# Patient Record
Sex: FEMALE | Race: WHITE | URBAN - METROPOLITAN AREA
[De-identification: names, ages, dates, MRNs, and addresses within clinical notes are randomized per-mention and may not be internally consistent; named-entity substitution may affect disease eponyms.]

---

## 2024-10-15 ENCOUNTER — LAB OUTSIDE AN ENCOUNTER (OUTPATIENT)
Dept: URBAN - METROPOLITAN AREA CLINIC 54 | Facility: CLINIC | Age: 69
End: 2024-10-15

## 2024-10-15 ENCOUNTER — OFFICE VISIT (OUTPATIENT)
Dept: URBAN - METROPOLITAN AREA CLINIC 54 | Facility: CLINIC | Age: 69
End: 2024-10-15
Payer: MEDICARE

## 2024-10-15 ENCOUNTER — DASHBOARD ENCOUNTERS (OUTPATIENT)
Age: 69
End: 2024-10-15

## 2024-10-15 VITALS
BODY MASS INDEX: 29.82 KG/M2 | DIASTOLIC BLOOD PRESSURE: 70 MMHG | HEIGHT: 71 IN | HEART RATE: 78 BPM | TEMPERATURE: 97.2 F | WEIGHT: 213 LBS | SYSTOLIC BLOOD PRESSURE: 137 MMHG

## 2024-10-15 DIAGNOSIS — Z15.09 LYNCH SYNDROME: ICD-10-CM

## 2024-10-15 DIAGNOSIS — Z90.710 S/P HYSTERECTOMY: ICD-10-CM

## 2024-10-15 PROCEDURE — 99204 OFFICE O/P NEW MOD 45 MIN: CPT

## 2024-10-15 PROCEDURE — 99244 OFF/OP CNSLTJ NEW/EST MOD 40: CPT

## 2024-10-15 RX ORDER — ESCITALOPRAM OXALATE 20 MG/1
1 TABLET TABLET, FILM COATED ORAL ONCE A DAY
Status: ACTIVE | COMMUNITY

## 2024-10-15 RX ORDER — MYCOPHENOLATE MOFETIL 500 MG/1
1 TABLET TABLET, FILM COATED ORAL TWICE A DAY
Status: ACTIVE | COMMUNITY

## 2024-10-15 NOTE — HPI-TODAY'S VISIT:
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 A copy of this document will be sent to the provider. 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 Testing was done because pt's sister had ovarian cancer dx around ager 54,  at 58. Pt recalled having abnormal precancerous ovarian cells at time of her own hysterectomy. Strong family hx of different cancers on both sides; does not recall any in particular, no fam hx of CRC. Wanted testing because she has daughters and grandaughters. Pt has never had a colonoscopy or EGD. No recent imaging. Denies any symptoms of concern. No abdominal pain, change in bowel habits, hematochezia, melena. Pt has had some unintentional weight loss over the last year which she attributes to Covid, pneumonia, and grief.

## 2025-01-07 ENCOUNTER — CLAIMS CREATED FROM THE CLAIM WINDOW (OUTPATIENT)
Dept: URBAN - METROPOLITAN AREA CLINIC 4 | Facility: CLINIC | Age: 70
End: 2025-01-07
Payer: MEDICARE

## 2025-01-07 ENCOUNTER — CLAIMS CREATED FROM THE CLAIM WINDOW (OUTPATIENT)
Dept: URBAN - METROPOLITAN AREA SURGERY CENTER 14 | Facility: SURGERY CENTER | Age: 70
End: 2025-01-07
Payer: MEDICARE

## 2025-01-07 DIAGNOSIS — K64.8 HEMORRHOIDS, INTERNAL: ICD-10-CM

## 2025-01-07 DIAGNOSIS — K29.70 CHRONIC ACITVE GASTRITIS (H.PYLORI NEGATIVE): ICD-10-CM

## 2025-01-07 DIAGNOSIS — K31.89 OTHER DISEASES OF STOMACH AND DUODENUM: ICD-10-CM

## 2025-01-07 DIAGNOSIS — Z15.09 BIALLELIC MUTATION OF MSH6 GENE: ICD-10-CM

## 2025-01-07 DIAGNOSIS — K57.30 DIVERTICULA, COLON: ICD-10-CM

## 2025-01-07 DIAGNOSIS — K31.89 ACHYLIA: ICD-10-CM

## 2025-01-07 DIAGNOSIS — K29.70 GASTRITIS, UNSPECIFIED, WITHOUT BLEEDING: ICD-10-CM

## 2025-01-07 DIAGNOSIS — Z80.0 FAMILY HISTORY OF COLON CANCER: ICD-10-CM

## 2025-01-07 DIAGNOSIS — Z83.719 FAMILY HISTORY OF COLON POLYPS, UNSPECIFIED: ICD-10-CM

## 2025-01-07 PROCEDURE — 88312 SPECIAL STAINS GROUP 1: CPT | Performed by: PATHOLOGY

## 2025-01-07 PROCEDURE — 43239 EGD BIOPSY SINGLE/MULTIPLE: CPT | Performed by: CLINIC/CENTER

## 2025-01-07 PROCEDURE — 00813 ANES UPR LWR GI NDSC PX: CPT | Performed by: NURSE ANESTHETIST, CERTIFIED REGISTERED

## 2025-01-07 PROCEDURE — 43239 EGD BIOPSY SINGLE/MULTIPLE: CPT | Performed by: INTERNAL MEDICINE

## 2025-01-07 PROCEDURE — 88305 TISSUE EXAM BY PATHOLOGIST: CPT | Performed by: PATHOLOGY

## 2025-01-07 PROCEDURE — G0105 COLORECTAL SCRN; HI RISK IND: HCPCS | Performed by: CLINIC/CENTER

## 2025-01-07 PROCEDURE — G0105 COLORECTAL SCRN; HI RISK IND: HCPCS | Performed by: INTERNAL MEDICINE

## 2025-01-07 RX ORDER — MYCOPHENOLATE MOFETIL 500 MG/1
1 TABLET TABLET, FILM COATED ORAL TWICE A DAY
Status: ACTIVE | COMMUNITY

## 2025-01-07 RX ORDER — ESCITALOPRAM OXALATE 20 MG/1
1 TABLET TABLET, FILM COATED ORAL ONCE A DAY
Status: ACTIVE | COMMUNITY

## 2025-01-23 ENCOUNTER — OFFICE VISIT (OUTPATIENT)
Dept: URBAN - METROPOLITAN AREA CLINIC 54 | Facility: CLINIC | Age: 70
End: 2025-01-23
Payer: MEDICARE

## 2025-01-23 VITALS
DIASTOLIC BLOOD PRESSURE: 76 MMHG | HEIGHT: 71 IN | WEIGHT: 215 LBS | TEMPERATURE: 97.6 F | HEART RATE: 73 BPM | SYSTOLIC BLOOD PRESSURE: 156 MMHG | BODY MASS INDEX: 30.1 KG/M2

## 2025-01-23 DIAGNOSIS — Z90.710 S/P HYSTERECTOMY: ICD-10-CM

## 2025-01-23 DIAGNOSIS — Z15.09 LYNCH SYNDROME: ICD-10-CM

## 2025-01-23 PROCEDURE — 99212 OFFICE O/P EST SF 10 MIN: CPT

## 2025-01-23 RX ORDER — MYCOPHENOLATE MOFETIL 500 MG/1
1 TABLET TABLET, FILM COATED ORAL TWICE A DAY
Status: ACTIVE | COMMUNITY

## 2025-01-23 RX ORDER — ESCITALOPRAM OXALATE 20 MG/1
1 TABLET TABLET, FILM COATED ORAL ONCE A DAY
Status: ACTIVE | COMMUNITY

## 2025-01-23 NOTE — HPI-TODAY'S VISIT:
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 A copy of this document will be sent to the provider. 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 Testing was done because pt's sister had ovarian cancer dx around ager 54,  at 58. Pt recalled having abnormal precancerous ovarian cells at time of her own hysterectomy. Strong family hx of different cancers on both sides; does not recall any in particular, no fam hx of CRC. Wanted testing because she has daughters and grandaughters. Pt has never had a colonoscopy or EGD. No recent imaging. Denies any symptoms of concern. No abdominal pain, change in bowel habits, hematochezia, melena. Pt has had some unintentional weight loss over the last year which she attributes to Covid, pneumonia, and grief.  25 Follow Up: Patient with personal history of positive genetic test for Burris syndrome returns for EGD/cscope follow up, detailed below. No precancerous/cancerous findings. Repeat bidirectional endoscopies in 3 years for surveillance purposes.  EGD 25: - Normal esophagus. - Z-line regular, 37 cm from the incisors. - Gastric mucosal atrophy. Biopsied. - Erosive gastropathy with no bleeding and no stigmata of recent bleeding. - Normal examined duodenum. Biopsy: Antral biopsy with chemical/reactive gastropathy.  No evidence of H. pylori organisms or intestinal metaplasia.  Negative for dysplasia or malignancy.  No significant abnormality on stomach body biopsy.  Colonoscopy 25: - Diverticulosis in the sigmoid colon. - Internal hemorrhoids. - No specimens collected.